# Patient Record
Sex: MALE | Race: WHITE
[De-identification: names, ages, dates, MRNs, and addresses within clinical notes are randomized per-mention and may not be internally consistent; named-entity substitution may affect disease eponyms.]

---

## 2018-04-19 ENCOUNTER — HOSPITAL ENCOUNTER (EMERGENCY)
Dept: HOSPITAL 26 - MED | Age: 38
Discharge: HOME | End: 2018-04-19
Payer: MEDICARE

## 2018-04-19 VITALS — SYSTOLIC BLOOD PRESSURE: 127 MMHG | DIASTOLIC BLOOD PRESSURE: 87 MMHG

## 2018-04-19 VITALS — DIASTOLIC BLOOD PRESSURE: 78 MMHG | SYSTOLIC BLOOD PRESSURE: 125 MMHG

## 2018-04-19 VITALS — HEIGHT: 61 IN | WEIGHT: 194 LBS | BODY MASS INDEX: 36.63 KG/M2

## 2018-04-19 DIAGNOSIS — Z91.040: ICD-10-CM

## 2018-04-19 DIAGNOSIS — Z88.0: ICD-10-CM

## 2018-04-19 DIAGNOSIS — M47.896: Primary | ICD-10-CM

## 2018-04-19 PROCEDURE — 96372 THER/PROPH/DIAG INJ SC/IM: CPT

## 2018-04-19 PROCEDURE — 99283 EMERGENCY DEPT VISIT LOW MDM: CPT

## 2018-04-19 NOTE — NUR
Patient discharged with v/s stable. Written and verbal after care instructions 
given and explained. 

Patient alert, oriented and verbalized understanding of instructions. 
Ambulatory with steady gait. All questions addressed prior to discharge. ID 
band removed. Patient advised to follow up with PMD. Rx of NORCO, 
FLEXERIL,NAPROSYN given. Patient educated on indication of medication including 
possible reaction and side effects. Opportunity to ask questions provided and 
answered.

## 2018-04-19 NOTE — NUR
PT C/O LOWER RT SIDE BACK PAIN WITH SLIGHT RADIATION TO RT HIP. PT REPORTS 
RECENTLY MOVING AND LIFTING HEAVY OBJECTS, ALSO SLEEPING ON AN AIR MATTRES. 
DENIES INJURY. ADMITS TO HAVING CHRONIC BACK PAIN IN MEDICAL HISTORY. AMBULATES 
WITH NORMAL GAIT OT CHAIR A, NAD NOTED/STATED OTHERWISE.